# Patient Record
Sex: FEMALE | Race: WHITE
[De-identification: names, ages, dates, MRNs, and addresses within clinical notes are randomized per-mention and may not be internally consistent; named-entity substitution may affect disease eponyms.]

---

## 2019-06-25 ENCOUNTER — HOSPITAL ENCOUNTER (EMERGENCY)
Dept: HOSPITAL 26 - MED | Age: 24
Discharge: HOME | End: 2019-06-25
Payer: MEDICAID

## 2019-06-25 VITALS — DIASTOLIC BLOOD PRESSURE: 69 MMHG | SYSTOLIC BLOOD PRESSURE: 130 MMHG

## 2019-06-25 VITALS — WEIGHT: 140 LBS | HEIGHT: 66 IN | BODY MASS INDEX: 22.5 KG/M2

## 2019-06-25 VITALS — DIASTOLIC BLOOD PRESSURE: 65 MMHG | SYSTOLIC BLOOD PRESSURE: 113 MMHG

## 2019-06-25 DIAGNOSIS — K52.9: Primary | ICD-10-CM

## 2019-06-25 DIAGNOSIS — Z79.899: ICD-10-CM

## 2019-06-25 DIAGNOSIS — R51: ICD-10-CM

## 2019-06-25 DIAGNOSIS — N39.0: ICD-10-CM

## 2019-06-25 DIAGNOSIS — F12.188: ICD-10-CM

## 2019-06-25 DIAGNOSIS — E87.6: ICD-10-CM

## 2019-06-25 LAB
ALBUMIN FLD-MCNC: 4.3 G/DL (ref 3.4–5)
AMYLASE SERPL-CCNC: 73 U/L (ref 25–115)
ANION GAP SERPL CALCULATED.3IONS-SCNC: 16.1 MMOL/L (ref 8–16)
APPEARANCE UR: (no result)
AST SERPL-CCNC: 21 U/L (ref 15–37)
BARBITURATES UR QL SCN: (no result) NG/ML
BASOPHILS # BLD AUTO: 0.1 K/UL (ref 0–0.22)
BASOPHILS NFR BLD AUTO: 0.9 % (ref 0–2)
BENZODIAZ UR QL SCN: (no result) NG/ML
BILIRUB SERPL-MCNC: 1.5 MG/DL (ref 0–1)
BILIRUB UR QL STRIP: (no result)
BUN SERPL-MCNC: 15 MG/DL (ref 7–18)
BZE UR QL SCN: (no result) NG/ML
CANNABINOIDS UR QL SCN: (no result) NG/ML
CHLORIDE SERPL-SCNC: 103 MMOL/L (ref 98–107)
CO2 SERPL-SCNC: 22 MMOL/L (ref 21–32)
COLOR UR: (no result)
CREAT SERPL-MCNC: 0.7 MG/DL (ref 0.6–1.3)
EOSINOPHIL # BLD AUTO: 0 K/UL (ref 0–0.4)
EOSINOPHIL NFR BLD AUTO: 0.1 % (ref 0–4)
ERYTHROCYTE [DISTWIDTH] IN BLOOD BY AUTOMATED COUNT: 12.8 % (ref 11.6–13.7)
GFR SERPL CREATININE-BSD FRML MDRD: 135 ML/MIN (ref 90–?)
GLUCOSE SERPL-MCNC: 137 MG/DL (ref 74–106)
GLUCOSE UR STRIP-MCNC: NEGATIVE MG/DL
HCT VFR BLD AUTO: 42.6 % (ref 36–48)
HGB BLD-MCNC: 14.6 G/DL (ref 12–16)
HGB UR QL STRIP: NEGATIVE
LEUKOCYTE ESTERASE UR QL STRIP: (no result)
LIPASE SERPL-CCNC: 121 U/L (ref 73–393)
LYMPHOCYTES # BLD AUTO: 1.1 K/UL (ref 2.5–16.5)
LYMPHOCYTES NFR BLD AUTO: 7.2 % (ref 20.5–51.1)
MCH RBC QN AUTO: 30 PG (ref 27–31)
MCHC RBC AUTO-ENTMCNC: 34 G/DL (ref 33–37)
MCV RBC AUTO: 88.5 FL (ref 80–94)
MONOCYTES # BLD AUTO: 0.4 K/UL (ref 0.8–1)
MONOCYTES NFR BLD AUTO: 2.5 % (ref 1.7–9.3)
NEUTROPHILS # BLD AUTO: 13.6 K/UL (ref 1.8–7.7)
NEUTROPHILS NFR BLD AUTO: 89.3 % (ref 42.2–75.2)
NITRITE UR QL STRIP: NEGATIVE
OPIATES UR QL SCN: (no result) NG/ML
PCP UR QL SCN: (no result) NG/ML
PH UR STRIP: 6 [PH] (ref 5–9)
PLATELET # BLD AUTO: 337 K/UL (ref 140–450)
POTASSIUM SERPL-SCNC: 3.1 MMOL/L (ref 3.5–5.1)
RBC # BLD AUTO: 4.82 MIL/UL (ref 4.2–5.4)
RBC #/AREA URNS HPF: (no result) /HPF (ref 0–5)
SODIUM SERPL-SCNC: 138 MMOL/L (ref 136–145)
WBC # BLD AUTO: 15.2 K/UL (ref 4.8–10.8)
WBC,URINE: (no result) /HPF (ref 0–5)

## 2019-06-25 PROCEDURE — 96361 HYDRATE IV INFUSION ADD-ON: CPT

## 2019-06-25 PROCEDURE — 96375 TX/PRO/DX INJ NEW DRUG ADDON: CPT

## 2019-06-25 PROCEDURE — 99284 EMERGENCY DEPT VISIT MOD MDM: CPT

## 2019-06-25 PROCEDURE — 96365 THER/PROPH/DIAG IV INF INIT: CPT

## 2019-06-25 PROCEDURE — 80305 DRUG TEST PRSMV DIR OPT OBS: CPT

## 2019-06-25 PROCEDURE — 83605 ASSAY OF LACTIC ACID: CPT

## 2019-06-25 PROCEDURE — 80053 COMPREHEN METABOLIC PANEL: CPT

## 2019-06-25 PROCEDURE — 82150 ASSAY OF AMYLASE: CPT

## 2019-06-25 PROCEDURE — 81001 URINALYSIS AUTO W/SCOPE: CPT

## 2019-06-25 PROCEDURE — 83690 ASSAY OF LIPASE: CPT

## 2019-06-25 PROCEDURE — 84703 CHORIONIC GONADOTROPIN ASSAY: CPT

## 2019-06-25 PROCEDURE — 85025 COMPLETE CBC W/AUTO DIFF WBC: CPT

## 2019-06-25 PROCEDURE — 87086 URINE CULTURE/COLONY COUNT: CPT

## 2019-06-25 PROCEDURE — 96368 THER/DIAG CONCURRENT INF: CPT

## 2019-06-25 PROCEDURE — 36415 COLL VENOUS BLD VENIPUNCTURE: CPT

## 2019-06-25 PROCEDURE — 74176 CT ABD & PELVIS W/O CONTRAST: CPT

## 2019-06-25 PROCEDURE — 81025 URINE PREGNANCY TEST: CPT

## 2019-06-25 PROCEDURE — 96372 THER/PROPH/DIAG INJ SC/IM: CPT

## 2019-06-25 NOTE — NUR
24 Y FEMALE BIB SELF C/O VOMITING, ABD PAIN AND HEADACHE SINCE THIS AM. PT 
ACTIVELY VOMITING CLEAR FLUIDS. STATES SHE SMOKED WEED YESTERDAY. SMOKES A 
COUPLE TIMES A DAY. DENIES CONSTIPATION OR DIARRHEA. ABDOMEN SOUNDS ACTIVE IN 
ALL 4 QUADRANTS. ABDOMEN SOFT AND FLAT. VSS AT THIS TIME. PT AA0X4. BED IS 
DOWN, LOCKED, BED RAIL X 1, ERMD TO SEE PT.

PMH- DENIES

## 2019-06-25 NOTE — NUR
Patient discharged with v/s stable. Written and verbal after care instructions 
given and explained. 

Patient alert, oriented and verbalized understanding of instructions. 
Ambulatory with steady gait. All questions addressed prior to discharge. ID 
band removed. Patient advised to follow up with PMD. Rx of LEVAQUIN TABLET, 
BENTYLCOMPAZINE AND PEDIALYTE given. Patient educated on indication of 
medication including possible reaction and side effects. Opportunity to ask 
questions provided and answered.

PT STATED SHE NO LONGER HAD N/V. PT PAIN LEVEL IS 3/10 PRIOR TO D/C

## 2019-06-25 NOTE — NUR
POTASSIUM WAS DICONTINUED. DUE TO PT ARM TURNING RED WITH SENSITIVITY PER ER 
MD. PT TOLERATED NS BOLUS WELL. VSS

## 2019-08-11 ENCOUNTER — HOSPITAL ENCOUNTER (EMERGENCY)
Dept: HOSPITAL 26 - MED | Age: 24
Discharge: HOME | End: 2019-08-11
Payer: MEDICAID

## 2019-08-11 VITALS — WEIGHT: 140 LBS | BODY MASS INDEX: 20.73 KG/M2 | HEIGHT: 69 IN

## 2019-08-11 DIAGNOSIS — Z79.899: ICD-10-CM

## 2019-08-11 DIAGNOSIS — K29.70: Primary | ICD-10-CM

## 2019-08-11 PROCEDURE — 96375 TX/PRO/DX INJ NEW DRUG ADDON: CPT

## 2019-08-11 PROCEDURE — 96374 THER/PROPH/DIAG INJ IV PUSH: CPT

## 2019-08-11 PROCEDURE — 81002 URINALYSIS NONAUTO W/O SCOPE: CPT

## 2019-08-11 PROCEDURE — C9113 INJ PANTOPRAZOLE SODIUM, VIA: HCPCS

## 2019-08-11 PROCEDURE — 81025 URINE PREGNANCY TEST: CPT

## 2019-08-11 PROCEDURE — 96361 HYDRATE IV INFUSION ADD-ON: CPT

## 2019-08-11 PROCEDURE — 99283 EMERGENCY DEPT VISIT LOW MDM: CPT

## 2019-08-11 NOTE — NUR
23 Y/O F BIB AUNT TO ED C/O SORETHROAT BODYACHES, H/A, ABDOMINAL PAIN 10/10 AND 
VOMITING ALL DAY. DENIES DIARRHEA, NO FEVER, AAOX4, GCS 15, RR EVEN UNLABORED, 
ABD SOFT NON-TENDER, ED MD DR. PRASAD MADE AWARE, WILL CONTINUE TO MONITOR 
CLOSELY, BED LOCKED IN LOWEST POSITION.

## 2019-08-12 VITALS — SYSTOLIC BLOOD PRESSURE: 108 MMHG | DIASTOLIC BLOOD PRESSURE: 64 MMHG

## 2019-08-12 NOTE — NUR
Patient discharged with v/s stable. Written and verbal after care instructions 
given and explained. 

Patient alert, oriented and verbalized understanding of instructions. 
Ambulatory with steady gait. All questions addressed prior to discharge. ID 
band removed. Patient advised to follow up with PMD. Rx of ZOFRAN ODT 4MG AND 
OMEPRAZOLE 40MG given. Patient educated on indication of medication including 
possible reaction and side effects. Opportunity to ask questions provided and 
answered.

## 2021-05-02 ENCOUNTER — HOSPITAL ENCOUNTER (EMERGENCY)
Dept: HOSPITAL 26 - MED | Age: 26
Discharge: HOME | End: 2021-05-02
Payer: COMMERCIAL

## 2021-05-02 VITALS — BODY MASS INDEX: 23.49 KG/M2 | HEIGHT: 68 IN | WEIGHT: 155 LBS

## 2021-05-02 VITALS — DIASTOLIC BLOOD PRESSURE: 81 MMHG | SYSTOLIC BLOOD PRESSURE: 134 MMHG

## 2021-05-02 DIAGNOSIS — F12.90: ICD-10-CM

## 2021-05-02 DIAGNOSIS — R05: ICD-10-CM

## 2021-05-02 DIAGNOSIS — Z20.822: ICD-10-CM

## 2021-05-02 DIAGNOSIS — R51.9: Primary | ICD-10-CM

## 2021-05-02 DIAGNOSIS — R43.8: ICD-10-CM

## 2021-05-02 DIAGNOSIS — Z79.899: ICD-10-CM

## 2021-05-02 PROCEDURE — U0003 INFECTIOUS AGENT DETECTION BY NUCLEIC ACID (DNA OR RNA); SEVERE ACUTE RESPIRATORY SYNDROME CORONAVIRUS 2 (SARS-COV-2) (CORONAVIRUS DISEASE [COVID-19]), AMPLIFIED PROBE TECHNIQUE, MAKING USE OF HIGH THROUGHPUT TECHNOLOGIES AS DESCRIBED BY CMS-2020-01-R: HCPCS

## 2021-05-02 PROCEDURE — 96372 THER/PROPH/DIAG INJ SC/IM: CPT

## 2021-05-02 PROCEDURE — 99283 EMERGENCY DEPT VISIT LOW MDM: CPT

## 2021-05-02 NOTE — NUR
Patient discharged with v/s stable. Written and verbal after care instructions 
given and explained. 

Patient alert, oriented and verbalized understanding of instructions. 
Ambulatory with steady gait. All questions addressed prior to discharge. ID 
band removed. Patient advised to follow up with PMD. Rx of MOTRIN & PREDNISONE 
given. Patient educated on indication of medication including possible reaction 
and side effects. Opportunity to ask questions provided and answered.

## 2021-05-02 NOTE — NUR
RECEIVED IN BED 12, REQUESTING COVID TESTING. PT STATED SHE WAS TOLD SHE COULD 
COME TO THE ER FOR COVID TESTING AND SAYS, "I DON'T FEEL WELL". PT IS AGITATED 
AND RESTLESS. RESPIRATIONS REGULAR AND UNLABORED